# Patient Record
Sex: MALE | Race: AMERICAN INDIAN OR ALASKA NATIVE
[De-identification: names, ages, dates, MRNs, and addresses within clinical notes are randomized per-mention and may not be internally consistent; named-entity substitution may affect disease eponyms.]

---

## 2018-02-07 ENCOUNTER — HOSPITAL ENCOUNTER (OUTPATIENT)
Dept: HOSPITAL 5 - SPVIMAG | Age: 58
Discharge: HOME | End: 2018-02-07
Attending: ORTHOPAEDIC SURGERY
Payer: COMMERCIAL

## 2018-02-07 DIAGNOSIS — M25.811: Primary | ICD-10-CM

## 2018-02-07 NOTE — XRAY REPORT
XRAY RIGHT SHOULDER THREE VIEWS: 02/07/18 11:16:00



CLINICAL: Right shoulder pain.



FINDINGS: Normal glenohumeral alignment.  Normal AC joint.  No fracture 

or dislocation.  Capsular calcifications at the inferior glenoid 

glenoid.  



IMPRESSION: Suspect adhesive capsulitis.